# Patient Record
Sex: MALE | Race: BLACK OR AFRICAN AMERICAN | NOT HISPANIC OR LATINO | Employment: FULL TIME | URBAN - METROPOLITAN AREA
[De-identification: names, ages, dates, MRNs, and addresses within clinical notes are randomized per-mention and may not be internally consistent; named-entity substitution may affect disease eponyms.]

---

## 2021-10-22 ENCOUNTER — APPOINTMENT (OUTPATIENT)
Dept: RADIOLOGY | Facility: HOSPITAL | Age: 31
DRG: 638 | End: 2021-10-22
Attending: EMERGENCY MEDICINE
Payer: COMMERCIAL

## 2021-10-22 ENCOUNTER — HOSPITAL ENCOUNTER (INPATIENT)
Facility: HOSPITAL | Age: 31
LOS: 2 days | Discharge: HOME | DRG: 638 | End: 2021-10-24
Attending: EMERGENCY MEDICINE | Admitting: FAMILY MEDICINE
Payer: COMMERCIAL

## 2021-10-22 DIAGNOSIS — E11.65 TYPE 2 DIABETES MELLITUS WITH HYPERGLYCEMIA, WITHOUT LONG-TERM CURRENT USE OF INSULIN (H): ICD-10-CM

## 2021-10-22 DIAGNOSIS — E11.10 DIABETIC KETOACIDOSIS WITHOUT COMA ASSOCIATED WITH TYPE 2 DIABETES MELLITUS (H): ICD-10-CM

## 2021-10-22 DIAGNOSIS — E10.10 DIABETIC KETOACIDOSIS WITHOUT COMA ASSOCIATED WITH TYPE 1 DIABETES MELLITUS (H): Primary | ICD-10-CM

## 2021-10-22 PROBLEM — R73.03 PREDIABETES: Status: ACTIVE | Noted: 2018-02-14

## 2021-10-22 PROBLEM — N17.9 ACUTE KIDNEY INJURY (H): Status: ACTIVE | Noted: 2021-10-22

## 2021-10-22 PROBLEM — F41.9 ANXIETY: Status: ACTIVE | Noted: 2021-10-22

## 2021-10-22 PROBLEM — I10 HYPERTENSION: Status: ACTIVE | Noted: 2021-10-22

## 2021-10-22 LAB
ALBUMIN SERPL-MCNC: 5 G/DL (ref 3.5–5)
ALBUMIN SERPL-MCNC: 5.1 G/DL (ref 3.5–5)
ALBUMIN UR-MCNC: 30 MG/DL
ALP SERPL-CCNC: 79 U/L (ref 45–120)
ALP SERPL-CCNC: 83 U/L (ref 45–120)
ALT SERPL W P-5'-P-CCNC: 26 U/L (ref 0–45)
ALT SERPL W P-5'-P-CCNC: 27 U/L (ref 0–45)
ANION GAP SERPL CALCULATED.3IONS-SCNC: 17 MMOL/L (ref 5–18)
ANION GAP SERPL CALCULATED.3IONS-SCNC: 19 MMOL/L (ref 5–18)
ANION GAP SERPL CALCULATED.3IONS-SCNC: 21 MMOL/L (ref 5–18)
ANION GAP SERPL CALCULATED.3IONS-SCNC: 24 MMOL/L (ref 5–18)
APPEARANCE UR: CLEAR
AST SERPL W P-5'-P-CCNC: 17 U/L (ref 0–40)
AST SERPL W P-5'-P-CCNC: 18 U/L (ref 0–40)
BACTERIA #/AREA URNS HPF: ABNORMAL /HPF
BASE EXCESS BLDV CALC-SCNC: -10.6 MMOL/L
BASOPHILS # BLD AUTO: 0 10E3/UL (ref 0–0.2)
BASOPHILS NFR BLD AUTO: 0 %
BILIRUB DIRECT SERPL-MCNC: 0.2 MG/DL
BILIRUB SERPL-MCNC: 0.7 MG/DL (ref 0–1)
BILIRUB SERPL-MCNC: 0.7 MG/DL (ref 0–1)
BILIRUB UR QL STRIP: NEGATIVE
BUN SERPL-MCNC: 10 MG/DL (ref 8–22)
BUN SERPL-MCNC: 12 MG/DL (ref 8–22)
BUN SERPL-MCNC: 8 MG/DL (ref 8–22)
BUN SERPL-MCNC: 9 MG/DL (ref 8–22)
CALCIUM SERPL-MCNC: 8.4 MG/DL (ref 8.5–10.5)
CALCIUM SERPL-MCNC: 8.5 MG/DL (ref 8.5–10.5)
CALCIUM SERPL-MCNC: 8.7 MG/DL (ref 8.5–10.5)
CALCIUM SERPL-MCNC: 9.7 MG/DL (ref 8.5–10.5)
CHLORIDE BLD-SCNC: 100 MMOL/L (ref 98–107)
CHLORIDE BLD-SCNC: 101 MMOL/L (ref 98–107)
CHLORIDE BLD-SCNC: 101 MMOL/L (ref 98–107)
CHLORIDE BLD-SCNC: 94 MMOL/L (ref 98–107)
CO2 SERPL-SCNC: 14 MMOL/L (ref 22–31)
CO2 SERPL-SCNC: 15 MMOL/L (ref 22–31)
CO2 SERPL-SCNC: 16 MMOL/L (ref 22–31)
CO2 SERPL-SCNC: 16 MMOL/L (ref 22–31)
COLOR UR AUTO: COLORLESS
CREAT SERPL-MCNC: 1.74 MG/DL (ref 0.7–1.3)
CREAT SERPL-MCNC: 1.77 MG/DL (ref 0.7–1.3)
CREAT SERPL-MCNC: 1.87 MG/DL (ref 0.7–1.3)
CREAT SERPL-MCNC: 2.23 MG/DL (ref 0.7–1.3)
EOSINOPHIL # BLD AUTO: 0 10E3/UL (ref 0–0.7)
EOSINOPHIL NFR BLD AUTO: 0 %
ERYTHROCYTE [DISTWIDTH] IN BLOOD BY AUTOMATED COUNT: 12.4 % (ref 10–15)
GFR SERPL CREATININE-BSD FRML MDRD: 38 ML/MIN/1.73M2
GFR SERPL CREATININE-BSD FRML MDRD: 47 ML/MIN/1.73M2
GFR SERPL CREATININE-BSD FRML MDRD: 50 ML/MIN/1.73M2
GFR SERPL CREATININE-BSD FRML MDRD: 51 ML/MIN/1.73M2
GLUCOSE BLD-MCNC: 193 MG/DL (ref 70–125)
GLUCOSE BLD-MCNC: 236 MG/DL (ref 70–125)
GLUCOSE BLD-MCNC: 242 MG/DL (ref 70–125)
GLUCOSE BLD-MCNC: 335 MG/DL (ref 70–125)
GLUCOSE BLDC GLUCOMTR-MCNC: 179 MG/DL (ref 70–99)
GLUCOSE BLDC GLUCOMTR-MCNC: 187 MG/DL (ref 70–99)
GLUCOSE BLDC GLUCOMTR-MCNC: 198 MG/DL (ref 70–99)
GLUCOSE BLDC GLUCOMTR-MCNC: 212 MG/DL (ref 70–99)
GLUCOSE BLDC GLUCOMTR-MCNC: 216 MG/DL (ref 70–99)
GLUCOSE BLDC GLUCOMTR-MCNC: 221 MG/DL (ref 70–99)
GLUCOSE BLDC GLUCOMTR-MCNC: 235 MG/DL (ref 70–99)
GLUCOSE BLDC GLUCOMTR-MCNC: 235 MG/DL (ref 70–99)
GLUCOSE BLDC GLUCOMTR-MCNC: 257 MG/DL (ref 70–99)
GLUCOSE BLDC GLUCOMTR-MCNC: 265 MG/DL (ref 70–99)
GLUCOSE BLDC GLUCOMTR-MCNC: 335 MG/DL (ref 70–99)
GLUCOSE UR STRIP-MCNC: >1000 MG/DL
HBA1C MFR BLD: 13.9 %
HCO3 BLDV-SCNC: 16 MMOL/L (ref 24–30)
HCT VFR BLD AUTO: 47.7 % (ref 40–53)
HGB BLD-MCNC: 16 G/DL (ref 13.3–17.7)
HGB UR QL STRIP: NEGATIVE
HYALINE CASTS: 3 /LPF
IMM GRANULOCYTES # BLD: 0 10E3/UL
IMM GRANULOCYTES NFR BLD: 0 %
KETONES BLD-SCNC: 5.6 MMOL/L
KETONES BLD-SCNC: 5.91 MMOL/L
KETONES BLD-SCNC: 7.29 MMOL/L
KETONES BLD-SCNC: 8.36 MMOL/L
KETONES UR STRIP-MCNC: >150 MG/DL
LACTATE SERPL-SCNC: 1.5 MMOL/L (ref 0.7–2)
LEUKOCYTE ESTERASE UR QL STRIP: NEGATIVE
LYMPHOCYTES # BLD AUTO: 1.5 10E3/UL (ref 0.8–5.3)
LYMPHOCYTES NFR BLD AUTO: 21 %
MAGNESIUM SERPL-MCNC: 2 MG/DL (ref 1.8–2.6)
MCH RBC QN AUTO: 27.3 PG (ref 26.5–33)
MCHC RBC AUTO-ENTMCNC: 33.5 G/DL (ref 31.5–36.5)
MCV RBC AUTO: 81 FL (ref 78–100)
MONOCYTES # BLD AUTO: 0.6 10E3/UL (ref 0–1.3)
MONOCYTES NFR BLD AUTO: 8 %
MUCOUS THREADS #/AREA URNS LPF: PRESENT /LPF
NEUTROPHILS # BLD AUTO: 5 10E3/UL (ref 1.6–8.3)
NEUTROPHILS NFR BLD AUTO: 71 %
NITRATE UR QL: NEGATIVE
NRBC # BLD AUTO: 0 10E3/UL
NRBC BLD AUTO-RTO: 0 /100
OSMOLALITY SERPL: 302 MOSM/KG (ref 270–300)
OXYHGB MFR BLDV: 62.6 % (ref 70–75)
PCO2 BLDV: 35 MM HG (ref 35–50)
PH BLDV: 7.27 [PH] (ref 7.35–7.45)
PH UR STRIP: 5 [PH] (ref 5–7)
PHOSPHATE SERPL-MCNC: 2.4 MG/DL (ref 2.5–4.5)
PLATELET # BLD AUTO: 297 10E3/UL (ref 150–450)
PO2 BLDV: 35 MM HG (ref 25–47)
POTASSIUM BLD-SCNC: 2.6 MMOL/L (ref 3.5–5)
POTASSIUM BLD-SCNC: 3 MMOL/L (ref 3.5–5)
POTASSIUM BLD-SCNC: 3.1 MMOL/L (ref 3.5–5)
POTASSIUM BLD-SCNC: 3.3 MMOL/L (ref 3.5–5)
PROT SERPL-MCNC: 9.4 G/DL (ref 6–8)
PROT SERPL-MCNC: 9.5 G/DL (ref 6–8)
RBC # BLD AUTO: 5.87 10E6/UL (ref 4.4–5.9)
RBC URINE: <1 /HPF
SAO2 % BLDV: 64 % (ref 70–75)
SARS-COV-2 RNA RESP QL NAA+PROBE: NEGATIVE
SODIUM SERPL-SCNC: 134 MMOL/L (ref 136–145)
SODIUM SERPL-SCNC: 134 MMOL/L (ref 136–145)
SODIUM SERPL-SCNC: 135 MMOL/L (ref 136–145)
SODIUM SERPL-SCNC: 135 MMOL/L (ref 136–145)
SP GR UR STRIP: 1.03 (ref 1–1.03)
SQUAMOUS EPITHELIAL: <1 /HPF
TROPONIN I SERPL-MCNC: 0.02 NG/ML (ref 0–0.29)
UROBILINOGEN UR STRIP-MCNC: <2 MG/DL
WBC # BLD AUTO: 7.1 10E3/UL (ref 4–11)
WBC URINE: 1 /HPF

## 2021-10-22 PROCEDURE — 258N000003 HC RX IP 258 OP 636: Performed by: EMERGENCY MEDICINE

## 2021-10-22 PROCEDURE — 82805 BLOOD GASES W/O2 SATURATION: CPT | Performed by: EMERGENCY MEDICINE

## 2021-10-22 PROCEDURE — 250N000013 HC RX MED GY IP 250 OP 250 PS 637: Performed by: FAMILY MEDICINE

## 2021-10-22 PROCEDURE — 200N000001 HC R&B ICU

## 2021-10-22 PROCEDURE — 250N000009 HC RX 250: Performed by: EMERGENCY MEDICINE

## 2021-10-22 PROCEDURE — 87040 BLOOD CULTURE FOR BACTERIA: CPT | Performed by: FAMILY MEDICINE

## 2021-10-22 PROCEDURE — 83036 HEMOGLOBIN GLYCOSYLATED A1C: CPT | Performed by: EMERGENCY MEDICINE

## 2021-10-22 PROCEDURE — 71045 X-RAY EXAM CHEST 1 VIEW: CPT

## 2021-10-22 PROCEDURE — 81003 URINALYSIS AUTO W/O SCOPE: CPT | Performed by: EMERGENCY MEDICINE

## 2021-10-22 PROCEDURE — 36569 INSJ PICC 5 YR+ W/O IMAGING: CPT

## 2021-10-22 PROCEDURE — 80053 COMPREHEN METABOLIC PANEL: CPT | Performed by: EMERGENCY MEDICINE

## 2021-10-22 PROCEDURE — 250N000009 HC RX 250: Performed by: FAMILY MEDICINE

## 2021-10-22 PROCEDURE — 258N000002 HC RX IP 258 OP 250: Performed by: FAMILY MEDICINE

## 2021-10-22 PROCEDURE — 99285 EMERGENCY DEPT VISIT HI MDM: CPT | Mod: 25

## 2021-10-22 PROCEDURE — 84100 ASSAY OF PHOSPHORUS: CPT | Performed by: FAMILY MEDICINE

## 2021-10-22 PROCEDURE — 87635 SARS-COV-2 COVID-19 AMP PRB: CPT | Performed by: EMERGENCY MEDICINE

## 2021-10-22 PROCEDURE — 83930 ASSAY OF BLOOD OSMOLALITY: CPT | Performed by: FAMILY MEDICINE

## 2021-10-22 PROCEDURE — 84450 TRANSFERASE (AST) (SGOT): CPT | Performed by: FAMILY MEDICINE

## 2021-10-22 PROCEDURE — 82010 KETONE BODYS QUAN: CPT | Performed by: EMERGENCY MEDICINE

## 2021-10-22 PROCEDURE — 84484 ASSAY OF TROPONIN QUANT: CPT | Performed by: EMERGENCY MEDICINE

## 2021-10-22 PROCEDURE — 36415 COLL VENOUS BLD VENIPUNCTURE: CPT | Performed by: FAMILY MEDICINE

## 2021-10-22 PROCEDURE — 36415 COLL VENOUS BLD VENIPUNCTURE: CPT | Performed by: EMERGENCY MEDICINE

## 2021-10-22 PROCEDURE — 82010 KETONE BODYS QUAN: CPT | Performed by: FAMILY MEDICINE

## 2021-10-22 PROCEDURE — 80048 BASIC METABOLIC PNL TOTAL CA: CPT | Performed by: FAMILY MEDICINE

## 2021-10-22 PROCEDURE — 83735 ASSAY OF MAGNESIUM: CPT | Performed by: FAMILY MEDICINE

## 2021-10-22 PROCEDURE — 83605 ASSAY OF LACTIC ACID: CPT | Performed by: EMERGENCY MEDICINE

## 2021-10-22 PROCEDURE — 250N000011 HC RX IP 250 OP 636: Performed by: FAMILY MEDICINE

## 2021-10-22 PROCEDURE — 96365 THER/PROPH/DIAG IV INF INIT: CPT

## 2021-10-22 PROCEDURE — 272N000452 HC KIT SHRLOCK 5FR POWER PICC TRIPLE LUMEN

## 2021-10-22 PROCEDURE — 258N000001 HC RX 258: Performed by: FAMILY MEDICINE

## 2021-10-22 PROCEDURE — 96361 HYDRATE IV INFUSION ADD-ON: CPT

## 2021-10-22 PROCEDURE — 250N000012 HC RX MED GY IP 250 OP 636 PS 637: Performed by: EMERGENCY MEDICINE

## 2021-10-22 PROCEDURE — 99223 1ST HOSP IP/OBS HIGH 75: CPT | Performed by: FAMILY MEDICINE

## 2021-10-22 PROCEDURE — C9803 HOPD COVID-19 SPEC COLLECT: HCPCS

## 2021-10-22 PROCEDURE — 93005 ELECTROCARDIOGRAM TRACING: CPT | Performed by: EMERGENCY MEDICINE

## 2021-10-22 PROCEDURE — 85025 COMPLETE CBC W/AUTO DIFF WBC: CPT | Performed by: EMERGENCY MEDICINE

## 2021-10-22 RX ORDER — HEPARIN SODIUM 5000 [USP'U]/.5ML
5000 INJECTION, SOLUTION INTRAVENOUS; SUBCUTANEOUS EVERY 12 HOURS
Status: DISCONTINUED | OUTPATIENT
Start: 2021-10-22 | End: 2021-10-24 | Stop reason: HOSPADM

## 2021-10-22 RX ORDER — LIDOCAINE 40 MG/G
CREAM TOPICAL
Status: DISCONTINUED | OUTPATIENT
Start: 2021-10-22 | End: 2021-10-24 | Stop reason: HOSPADM

## 2021-10-22 RX ORDER — ONDANSETRON 4 MG/1
4 TABLET, ORALLY DISINTEGRATING ORAL EVERY 6 HOURS PRN
Status: DISCONTINUED | OUTPATIENT
Start: 2021-10-22 | End: 2021-10-24 | Stop reason: HOSPADM

## 2021-10-22 RX ORDER — DEXTROSE MONOHYDRATE 25 G/50ML
25-50 INJECTION, SOLUTION INTRAVENOUS
Status: DISCONTINUED | OUTPATIENT
Start: 2021-10-22 | End: 2021-10-24 | Stop reason: HOSPADM

## 2021-10-22 RX ORDER — OXYMETAZOLINE HYDROCHLORIDE 0.05 G/100ML
2 SPRAY NASAL ONCE
Status: COMPLETED | OUTPATIENT
Start: 2021-10-22 | End: 2021-10-22

## 2021-10-22 RX ORDER — SODIUM CHLORIDE 450 MG/100ML
1000 INJECTION, SOLUTION INTRAVENOUS CONTINUOUS
Status: DISCONTINUED | OUTPATIENT
Start: 2021-10-22 | End: 2021-10-23

## 2021-10-22 RX ORDER — PANTOPRAZOLE SODIUM 20 MG/1
40 TABLET, DELAYED RELEASE ORAL
Status: DISCONTINUED | OUTPATIENT
Start: 2021-10-22 | End: 2021-10-24 | Stop reason: HOSPADM

## 2021-10-22 RX ORDER — SODIUM CHLORIDE 9 MG/ML
INJECTION, SOLUTION INTRAVENOUS CONTINUOUS
Status: DISCONTINUED | OUTPATIENT
Start: 2021-10-22 | End: 2021-10-23

## 2021-10-22 RX ORDER — ACETAMINOPHEN 325 MG/1
975 TABLET ORAL EVERY 8 HOURS
Status: DISCONTINUED | OUTPATIENT
Start: 2021-10-22 | End: 2021-10-24 | Stop reason: HOSPADM

## 2021-10-22 RX ORDER — DEXTROSE MONOHYDRATE, SODIUM CHLORIDE, AND POTASSIUM CHLORIDE 50; 2.24; 4.5 G/1000ML; G/1000ML; G/1000ML
INJECTION, SOLUTION INTRAVENOUS CONTINUOUS
Status: CANCELLED | OUTPATIENT
Start: 2021-10-22

## 2021-10-22 RX ORDER — ZOLPIDEM TARTRATE 5 MG/1
5 TABLET ORAL
Status: DISCONTINUED | OUTPATIENT
Start: 2021-10-22 | End: 2021-10-24 | Stop reason: HOSPADM

## 2021-10-22 RX ORDER — POTASSIUM CHLORIDE 29.8 MG/ML
20 INJECTION INTRAVENOUS
Status: COMPLETED | OUTPATIENT
Start: 2021-10-22 | End: 2021-10-22

## 2021-10-22 RX ORDER — ZOLPIDEM TARTRATE 6.25 MG/1
12.5 TABLET, FILM COATED, EXTENDED RELEASE ORAL
Status: DISCONTINUED | OUTPATIENT
Start: 2021-10-22 | End: 2021-10-22

## 2021-10-22 RX ORDER — HYDRALAZINE HYDROCHLORIDE 20 MG/ML
10 INJECTION INTRAMUSCULAR; INTRAVENOUS EVERY 6 HOURS PRN
Status: DISCONTINUED | OUTPATIENT
Start: 2021-10-22 | End: 2021-10-24 | Stop reason: HOSPADM

## 2021-10-22 RX ORDER — POTASSIUM CHLORIDE 29.8 MG/ML
20 INJECTION INTRAVENOUS
Status: COMPLETED | OUTPATIENT
Start: 2021-10-23 | End: 2021-10-23

## 2021-10-22 RX ORDER — CHLORTHALIDONE 50 MG/1
100 TABLET ORAL EVERY MORNING
Status: ON HOLD | COMMUNITY
Start: 2021-09-27 | End: 2021-10-24

## 2021-10-22 RX ORDER — ONDANSETRON 2 MG/ML
4 INJECTION INTRAMUSCULAR; INTRAVENOUS EVERY 6 HOURS PRN
Status: DISCONTINUED | OUTPATIENT
Start: 2021-10-22 | End: 2021-10-24 | Stop reason: HOSPADM

## 2021-10-22 RX ORDER — NICOTINE POLACRILEX 4 MG
15-30 LOZENGE BUCCAL
Status: DISCONTINUED | OUTPATIENT
Start: 2021-10-22 | End: 2021-10-24 | Stop reason: HOSPADM

## 2021-10-22 RX ORDER — ZOLPIDEM TARTRATE 12.5 MG/1
12.5 TABLET, FILM COATED, EXTENDED RELEASE ORAL
COMMUNITY
Start: 2021-10-17

## 2021-10-22 RX ADMIN — DEXTROSE MONOHYDRATE AND SODIUM CHLORIDE 1000 ML: 5; .45 INJECTION, SOLUTION INTRAVENOUS at 19:00

## 2021-10-22 RX ADMIN — SODIUM CHLORIDE 1000 ML: 9 INJECTION, SOLUTION INTRAVENOUS at 11:54

## 2021-10-22 RX ADMIN — HYDRALAZINE HYDROCHLORIDE 10 MG: 20 INJECTION INTRAMUSCULAR; INTRAVENOUS at 15:02

## 2021-10-22 RX ADMIN — POTASSIUM CHLORIDE 20 MEQ: 29.8 INJECTION, SOLUTION INTRAVENOUS at 20:10

## 2021-10-22 RX ADMIN — HEPARIN SODIUM 5000 UNITS: 10000 INJECTION, SOLUTION INTRAVENOUS; SUBCUTANEOUS at 21:04

## 2021-10-22 RX ADMIN — SODIUM CHLORIDE 1000 ML: 4.5 INJECTION, SOLUTION INTRAVENOUS at 14:23

## 2021-10-22 RX ADMIN — ACETAMINOPHEN 975 MG: 325 TABLET ORAL at 19:46

## 2021-10-22 RX ADMIN — POTASSIUM CHLORIDE 20 MEQ: 29.8 INJECTION, SOLUTION INTRAVENOUS at 23:38

## 2021-10-22 RX ADMIN — POTASSIUM CHLORIDE 20 MEQ: 29.8 INJECTION, SOLUTION INTRAVENOUS at 22:06

## 2021-10-22 RX ADMIN — POTASSIUM CHLORIDE 20 MEQ: 29.8 INJECTION, SOLUTION INTRAVENOUS at 21:02

## 2021-10-22 RX ADMIN — OXYMETHAZOLINE HCL 2 SPRAY: 0.05 SPRAY NASAL at 13:03

## 2021-10-22 RX ADMIN — SODIUM CHLORIDE 3 UNITS/HR: 9 INJECTION, SOLUTION INTRAVENOUS at 13:37

## 2021-10-22 RX ADMIN — LIDOCAINE HYDROCHLORIDE 1 ML: 10 INJECTION, SOLUTION EPIDURAL; INFILTRATION; INTRACAUDAL; PERINEURAL at 18:57

## 2021-10-22 RX ADMIN — SODIUM CHLORIDE 1000 ML: 9 INJECTION, SOLUTION INTRAVENOUS at 13:18

## 2021-10-22 ASSESSMENT — ACTIVITIES OF DAILY LIVING (ADL)
ADLS_ACUITY_SCORE: 8
ADLS_ACUITY_SCORE: 8
ADLS_ACUITY_SCORE: 7
ADLS_ACUITY_SCORE: 7
ADLS_ACUITY_SCORE: 8
ADLS_ACUITY_SCORE: 7
ADLS_ACUITY_SCORE: 8
ADLS_ACUITY_SCORE: 7

## 2021-10-22 ASSESSMENT — MIFFLIN-ST. JEOR: SCORE: 2668.58

## 2021-10-22 NOTE — PROCEDURES
"PICC Line Insertion Procedure Note  Pt. Name: Hermann Escalante Jr.  MRN:        [id number]    Procedure: Insertion of a  triple Lumen  5 fr  Bard SOLO (valved) Power PICC, Lot number [id number]    Indications: Vascular Access    Contraindications : none    Procedure Details   Patient identified with 2 identifiers and \"Time Out\" conducted.  .     Central line insertion bundle followed: hand hygeine performed prior to procedure, site cleansed with cholraprep, hat, mask, sterile gloves,sterile gown worn, patient draped with maximum barrier head to toe drape, sterile field maintained.    The vein was assessed and found to be compressible and of adequate size. 1 ml 1% Lidocaine administered sq to the insertion site. A 5 Fr PICC was inserted into the basilic vein of the right arm with ultrasound guidance. 1 attempt(s) required to access vein.   Catheter threaded without difficulty. Good blood return noted.    Modified Seldinger Technique used for insertion.    The 8 sharps that are included in the PICC insertion kit were accounted for and disposed of in the sharps container prior to breakdown of the sterile field.    Catheter secured with Statlock, biopatch and Tegaderm dressing applied.    Findings:  Total catheter length  48 cm, with 0 cm exposed. Mid upper arm circumference is 47 cm. Catheter was flushed with 30 cc NS. Patient  tolerated procedure well.    Tip placement verified by 3CG technology . Tip placement in the SVC.    CLABSI prevention brochure left at bedside.    Patient's primary RN notified PICC is ready for use.    Comments:          Andreina LAMBN,RN,[facility] V[facility]        "

## 2021-10-22 NOTE — PHARMACY-ADMISSION MEDICATION HISTORY
Pharmacy Note - Admission Medication History       ______________________________________________________________________    Prior To Admission (PTA) med list completed and updated in EMR.       PTA Med List   Medication Sig Last Dose     chlorthalidone (HYGROTON) 50 MG tablet Take 100 mg by mouth every morning Past Week     metFORMIN (GLUCOPHAGE) 500 MG tablet Take 500 mg by mouth daily (with breakfast) Past Week     omeprazole (PRILOSEC) 20 MG DR capsule Take 20 mg by mouth 2 times daily (before meals) Has not started     zolpidem ER (AMBIEN CR) 12.5 MG CR tablet Take 12.5 mg by mouth nightly as needed Past Month       Information source(s): Patient, Clinic records and [facility]  Method of interview communication: in-person    Summary of Changes to PTA Med List  New: chlorthalidone, zolpidem, metformin, omeprazole   Discontinued: Flonase  Changed: none    Patient was asked about OTC/herbal products specifically.  PTA med list reflects this.    In the past week, patient estimated taking medication this percent of the time:  50-90% due to other.    Allergies were reviewed, assessed, and updated with the patient.      Patient does not use any multi-dose medications prior to admission.    The information provided in this note is only as accurate as the sources available at the time of the update(s).    Thank you for the opportunity to participate in the care of this patient.    Radames España [facility]  10/22/2021 1:52 PM

## 2021-10-22 NOTE — ED PROVIDER NOTES
"EMERGENCY DEPARTMENT NOTE     Name: Hermann Escalante    Age/Sex: 30 year old male   MRN: [id number]   Evaluation Date & Time:  No admission date for patient encounter.    PCP:    No Ref-Primary, Physician   ED Provider: Dorian Fields D.O.       CHIEF COMPLAINT    Abnormal Labs (kidney function/hyperglycemia)       DIAGNOSIS & DISPOSITION     1. Diabetic ketoacidosis without coma associated with type 2 diabetes mellitus (H)      DISPOSITION: Home    At the conclusion of the encounter I discussed the results of all of the tests and the disposition. The questions were answered. The patient or family acknowledged understanding and was agreeable with the care plan.    TOTAL CRITICAL CARE TIME (EXCLUDING PROCEDURES): 30 minutes    PROCEDURES:   None    EMERGENCY DEPARTMENT COURSE/MEDICAL DECISION MAKING   12:23 PM I met with the patient to gather history and to perform my initial exam.  We discussed treatment options and the plan for care while in the Emergency Department. PPE: Provider wore gloves, N95 mask, eye protection, surgical cap, and paper mask.  1:07 PM I spoke with Dr. Vasquez, Intensivist.    1:25 PM I discussed the case with hospitalist, Dr Gomez, who accepts the patient     Hermann Escalante is a 30 year old male with relevant past history of non-insulin-dependent diabetes and hypertension who presents to the emergency department for evaluation for out patient clinic evaluation for nausea yesterday indicated diabetic ketoacidosis  Triage note reviewed:Pt was called last night by primary clinic, [place], with test results and instructed pt to be seen in ED for evaluation. Phone diagnosed Type II Diabetic. BS currently 335.     Vital signs:BP (!) 149/69   Pulse 120   Temp 98  F (36.7  C)   Resp 25   Ht 1.905 m (6' 3\")   Wt (!) 163.3 kg (360 lb)   SpO2 100%   BMI 45.00 kg/m    Pertinent physical exam findings:  HEENT: Neck supple without meningeal irritation  Oropharynx normal  Cardiac: Tachycardic no " murmur rub  Pulmonary: Lungs are clear to ascultation bilaterally with good breath sounds  Abdomen: Soft nontender, positive bowel sounds.  No organomegaly or mass  Skin: No rash or open ulceration identified  Diagnostic studies:  Imaging: Portable chest x-ray: No infiltrate  Lab: Glucose 335, CO2 16, ketones 8.3, lactic acid 1.5, pH 7.27 creatinine 2.2 with GFR 38, potassium 3.3  EKG: Sinus tachycardia.  No STEMI.  Troponin 0 0.02  Interventions: IV fluids, subcutaneous and IV insulin  Medical decision making: Patient remains with mild tachycardia but otherwise hemodynamically stable.  No inciting cause of DKA is identified currently with no evidence of pneumonia, urinary tract infection, evidence of ACS.  Abdominal exam nontender and does not appear to have acute surgical process.  Limited skin exam in the hallway of the ED but no reported diabetic foot ulcers or symptoms of cellulitis.  Patient will be admitted to the medical ICU with IV insulin, fluids for treatment of DKA.    ED INTERVENTIONS     Medications   0.9% sodium chloride BOLUS (0 mLs Intravenous Stopped 10/22/21 1320)     Followed by   sodium chloride 0.9% infusion ( Intravenous Not Given 10/22/21 1321)   glucose gel 15-30 g (has no administration in time range)     Or   dextrose 50 % injection 25-50 mL (has no administration in time range)     Or   glucagon injection 1 mg (has no administration in time range)   dextrose 5% and 0.45% NaCl infusion ( Intravenous Canceled Entry 10/22/21 1421)   dextrose 50 % injection 25-50 mL (has no administration in time range)   0.9% sodium chloride BOLUS (0 mLs Intravenous Stopped 10/22/21 1421)     Followed by   0.45% sodium chloride infusion ( Intravenous Rate/Dose Verify 10/22/21 1600)   insulin 1 unit/1 mL in NS (NovoLIN, HumuLIN Regular) drip -ED DKA algorithm (10 Units/hr Intravenous Rate/Dose Change 10/22/21 1640)   heparin ANTICOAGULANT injection 5,000 Units (has no administration in time range)   lidocaine  1 % 0.1-1 mL (has no administration in time range)   lidocaine (LMX4) cream (has no administration in time range)   sodium chloride (PF) 0.9% PF flush 3 mL (3 mLs Intracatheter Not Given 10/22/21 1504)   sodium chloride (PF) 0.9% PF flush 3 mL (has no administration in time range)   Patient is already receiving anticoagulation with heparin, enoxaparin (LOVENOX), warfarin (COUMADIN)  or other anticoagulant medication (has no administration in time range)   ondansetron (ZOFRAN-ODT) ODT tab 4 mg (has no administration in time range)     Or   ondansetron (ZOFRAN) injection 4 mg (has no administration in time range)   acetaminophen (TYLENOL) tablet 975 mg (975 mg Oral Not Given 10/22/21 1505)   hydrALAZINE (APRESOLINE) injection 10 mg (10 mg Intravenous Given 10/22/21 1502)   pantoprazole (PROTONIX) EC tablet 40 mg (has no administration in time range)   zolpidem (AMBIEN) tablet 5 mg (has no administration in time range)   lidocaine 1 % 0.1-5 mL (has no administration in time range)   lidocaine (LMX4) cream (has no administration in time range)   sodium chloride (PF) 0.9% PF flush 10-40 mL (has no administration in time range)   oxymetazoline (AFRIN) 0.05 % spray 2 spray (2 sprays Nasal Given 10/22/21 1303)       DISCHARGE MEDICATIONS        Review of your medicines      UNREVIEWED medicines. Ask your doctor about these medicines      Dose / Directions   chlorthalidone 50 MG tablet  Commonly known as: HYGROTON      Dose: 100 mg  Take 100 mg by mouth every morning  Refills: 0     metFORMIN 500 MG tablet  Commonly known as: GLUCOPHAGE      Dose: 500 mg  Take 500 mg by mouth daily (with breakfast)  Refills: 0     omeprazole 20 MG DR capsule  Commonly known as: priLOSEC      Dose: 20 mg  Take 20 mg by mouth 2 times daily (before meals)  Refills: 0     zolpidem ER 12.5 MG CR tablet  Commonly known as: AMBIEN CR      Dose: 12.5 mg  Take 12.5 mg by mouth nightly as needed  Refills: 0              INFORMATION SOURCE AND  LIMITATIONS    History/Exam limitations: none  Patient information was obtained from: the patient   Use of : N/A    HISTORY OF PRESENT ILLNESS   Hermann Escalante male with a relevant past history of anxiety, hypertension, and NIDDM, who presents to this ED via walk in for evaluation of abnormal labs.    The patient reports that he was referred to the ED after being diagnosed with diabetes this morning. The patient endorses nausea and urinary frequency. He states that he has attempted to vomit but has not been able to. He denies cough, chest pain, shortness of breath, diarrhea, sores on feet, and any other symptoms or complaints at this time.         REVIEW OF SYSTEMS:   Constitutional: Negative for  fever.   HENT: Negative for URI symptoms or sore throat.    Cardiac: Negative for chest pain, palpitations, near syncope or syncope  Respiratory: Negative for cough and shortness of breath.    Gastrointestinal: Negative for abdominal pain, vomiting, constipation, diarrhea, rectal bleeding or melena. Positive for nausea.   Genitourinary: Negative for dysuria, flank pain and hematuria. Positive for frequency.  Musculoskeletal: Negative for back pain.   Skin: Negative for  rash  Neurological: Negative for dizziness, headache, syncope, speech difficulty, unilateral weakness or imbalance with walking.   Hematological: Negative for adenopathy. Does not bruise/bleed easily.   Psychiatric/Behavioral: Negative for confusion.       PATIENT HISTORY     Past Medical History:   Diagnosis Date     Diabetes (H)      Hypertension      Morbid obesity (H)      Sleep apnea      Patient Active Problem List   Diagnosis     Anxiety     Hypertension     Prediabetes     Type 2 diabetes mellitus with hyperglycemia, without long-term current use of insulin (H)     Acute kidney injury (H)     DKA (diabetic ketoacidosis) (H)     Past Surgical History:   Procedure Laterality Date     NO HISTORY OF SURGERY       Social Histrory  Smoking:  Never smoked  Alcohol Use: No alcohol use  Allergies   Allergen Reactions     Tramadol Itching     Amlodipine Palpitations         OUTPATIENT MEDICATIONS     Current Discharge Medication List         Vitals:    10/22/21 1615 10/22/21 1630 10/22/21 1645 10/22/21 1700   BP: (!) 158/73 (!) 149/69     Pulse: 119 119 (!) 123 120   Resp: (!) 31 25 27 25   Temp:       TempSrc:       SpO2: 100% 99% 100%    Weight:       Height:           Physical Exam   Constitutional: Oriented to person, place, and time. Appears well-developed and well-nourished.   HEENT:    Head: Atraumatic.   Neck: Normal range of motion. Neck supple.   Cardiovascular: Normal rate, regular rhythm and normal heart sounds.    Pulmonary/Chest: Normal effort  and breath sounds normal.   Abdominal: Soft. Bowel sounds are normal.   Musculoskeletal: Normal range of motion.   Neurological: Alert and oriented to person, place, and time. Normal strength.No sensory deficit. No cranial nerve deficit . Skin: Skin is warm and dry.   Psychiatric: Normal mood and affect. Behavior is normal. Thought content normal.       DIAGNOSTICS    LABORATORY FINDINGS (REVIEWED AND INTERPRETED):  Labs Ordered and Resulted from Time of ED Arrival Up to the Time of Departure from the ED   KETONE BETA-HYDROXYBUTYRATE QUANTITATIVE, RAPID - Abnormal; Notable for the following components:       Result Value    Ketone (Beta-Hydroxybutyrate) Quantitative 8.36 (*)     All other components within normal limits   COMPREHENSIVE METABOLIC PANEL - Abnormal; Notable for the following components:    Sodium 134 (*)     Potassium 3.3 (*)     Chloride 94 (*)     Carbon Dioxide (CO2) 16 (*)     Anion Gap 24 (*)     Creatinine 2.23 (*)     Glucose 335 (*)     Protein Total 9.4 (*)     GFR Estimate 38 (*)     All other components within normal limits   ROUTINE UA WITH MICROSCOPIC REFLEX TO CULTURE - Abnormal; Notable for the following components:    Glucose Urine >1000 (*)     Ketones Urine >150 (*)      Specific Gravity Urine 1.033 (*)     Protein Albumin Urine 30  (*)     Bacteria Urine Few (*)     Mucus Urine Present (*)     Hyaline Casts Urine 3 (*)     All other components within normal limits    Narrative:     Urine Culture not indicated   GLUCOSE BY METER - Abnormal; Notable for the following components:    GLUCOSE BY METER POCT 335 (*)     All other components within normal limits   BLOOD GAS VENOUS - Abnormal; Notable for the following components:    pH Venous 7.27 (*)     Bicarbonate Venous 16 (*)     Oxyhemoglobin Venous 62.6 (*)     O2 Sat, Venous 64.0 (*)     All other components within normal limits   HEMOGLOBIN A1C - Abnormal; Notable for the following components:    Hemoglobin A1C 13.9 (*)     All other components within normal limits   GLUCOSE BY METER - Abnormal; Notable for the following components:    GLUCOSE BY METER POCT 265 (*)     All other components within normal limits   LACTIC ACID WHOLE BLOOD - Normal   TROPONIN I - Normal   COVID-19 VIRUS (CORONAVIRUS) BY PCR - Normal    Narrative:     Testing was performed using the yessenia  SARS-CoV-2 & Influenza A/B Assay on the yessenia  Rashida  System.  This test should be ordered for the detection of SARS-COV-2 in individuals who meet SARS-CoV-2 clinical and/or epidemiological criteria. Test performance is unknown in asymptomatic patients.  This test is for in vitro diagnostic use under the FDA EUA for laboratories certified under CLIA to perform moderate and/or high complexity testing. This test has not been FDA cleared or approved.  A negative test does not rule out the presence of PCR inhibitors in the specimen or target RNA in concentration below the limit of detection for the assay. The possibility of a false negative should be considered if the patient's recent exposure or clinical presentation suggests COVID-19.  [facility] are certified under the Clinical Laboratory Improvement Amendments of 1988 (CLIA-88) as qualified to  perform moderate and/or high complexity laboratory testing.   CBC WITH PLATELETS AND DIFFERENTIAL   GLUCOSE MONITOR NURSING POCT   GLUCOSE MONITOR NURSING POCT   GLUCOSE MONITOR NURSING POCT   GLUCOSE MONITOR NURSING POCT   GLUCOSE MONITOR NURSING POCT   KETONE BETA-HYDROXYBUTYRATE QUANTITATIVE, RAPID   BASIC METABOLIC PANEL   BASIC METABOLIC PANEL   IP CARBOHYDRATE COUNTING BY NURSING   PATIENT EDUCATION   ASSESS FOR HYPOGLYCEMIA SYMPTOMS   NOTIFY PHYSICIAN   CALL   CALL   CARDIAC CONTINUOUS MONITORING   NOTIFY PHYSICIAN   PERIPHERAL IV CATHETER   IV ACCESS   ASSIGN ATTENDING PROVIDER   ASSIGN ATTENDING PROVIDER   INTAKE AND OUTPUT   CARDIAC CONTINUOUS MONITORING   TELEMETRY MONITORING CARDIOLOGY ADULT   NOTIFY PHYSICIAN   PATIENT EDUCATION   ASSESS FOR HYPOGLYCEMIA SYMPTOMS   NOTIFY PHYSICIAN   VITAL SIGNS   PERIPHERAL IV CATHETER   NO VTE PROPHYLAXIS   BLOOD CULTURE   CBC WITH PLATELETS & DIFFERENTIAL    Narrative:     The following orders were created for panel order CBC with platelets differential.  Procedure                               Abnormality         Status                     ---------                               -----------         ------                     CBC with platelets and d...[[id number]]                      Final result                 Please view results for these tests on the individual orders.         IMAGING (REVIEWED AND INTERPRETED):  XR Chest Port 1 View   Final Result   IMPRESSION: Large body habitus. Lungs are clear. Heart and pulmonary vascularity are normal. No signs of acute disease.          ECG (REVIEWED AND INTERPRETED):   ECG:   Performed at: 11:35  HR:  105 bpm  Rhythm: Sinus tachycardia  Axis: -36  QRS duration: 102  QTC: 489  ST changes: No ST segment elevation or depression, no T wave inversion,No Q wave  Interpretation: Sinus tachycardia,non ischemic  No prior for comparison    I have reviewed the patient's ECG, with comments made as listed above. Please see scanned  image for full interpretation.       I, Carmina Oropeza, am serving as a scribe to document services personally performed by Dorian Fields D.O., based on my observation and the provider s statements to me.    I, Dorian Fields D.O., attest that Carmina Sandip is acting in a scribe capacity, has observed my performance of the services and has documented them in accordance with my direction.    Dorian Fields D.O.  EMERGENCY MEDICINE   10/22/21  [facility] EMERGENCY DEPARTMENT  [address] [place] [id number]  [phone]  Dept: [phone]      Dorian Fields, DO  10/22/21 1826

## 2021-10-22 NOTE — ED NOTES
Report called to ICU RN.  All labs, meds and treatments reviewed.  Glucose to be done on arrival to ICU reported to ICU RN

## 2021-10-22 NOTE — PROGRESS NOTES
"Care Management Note    Length of Stay (days): 0    Expected Discharge Date: 10/24/2021       Concerns to be Addressed:   Alteration in labs (DKA, CHET), IV fluids at 250 ml/hour, insulin drip.  Patient plan of care discussed at interdisciplinary rounds: Yes    Anticipated Discharge Disposition:  Discharge goal is home pending response to treatment, medical needs and mobility closer to discharge.      Anticipated Discharge Services:  No skilled in-home services anticipated.   Anticipated Discharge DME:  No new DME anticipated.    Patient/family educated on Medicare website which has current facility and service quality ratings:  NA  Education Provided on the Discharge Plan:  Per team  Patient/Family in Agreement with the Plan:  yes    Referrals Placed by CM/SW:  None  Private pay costs discussed: Not applicable     Additional Information:  Patient is  and independent with activities of daily living at baseline.  He has a history of PARKER and uses CPAP at home. No care management needs identified at this time but CM will continue to monitor progression of care, review team recommendations and provide discharge planning assist as needed.    Addendum: Per pharmacy liaison, \"Test claim for Diabetes Ed: Lantus $25 Humalog $25 and Preferred meter and supplies One Touch covered 100% no cost for the patient.\" This has been documented in a sticky note for MD.     Carmina Lemon RN      "

## 2021-10-22 NOTE — ED NOTES
Pt up to bathroom.  Nosebleed bilat nares.  Pressure held MD notified.  Resolved after pressure and afrin.

## 2021-10-22 NOTE — ED TRIAGE NOTES
Pt was called last night by primary clinic, [place], with test results and instructed pt to be seen in ED for evaluation. Phone diagnosed Type II Diabetic. BS currently 335.

## 2021-10-22 NOTE — H&P
Admission History and Physical   Hermann Escalante Jr., 1990, [id number]  [facility]  DKA (diabetic ketoacidosis) (H) [E11.10]  PCP:Lili Mix, [phone]   Admitting provider: Nicole Dickerson MD.    Code status:  Full Code          Extended Emergency Contact Information  Primary Emergency Contact: Gina Maher  Address: [facility] [facility] [address] [place] [address] [facility]  Home Phone: [phone]  Relation: Spouse  Secondary Emergency Contact: ELY GOLDSTEIN  Home Phone: [phone]  Relation: Mother  Father: Hermann Escalante  Address: [address] [place] [address] [facility]  Home Phone: [phone]     Date of service: October 22, 2021    Assessment and Plan  Active Problems:    Hypertension    Type 2 diabetes mellitus with hyperglycemia, without long-term current use of insulin (H)    Acute kidney injury (H)    DKA (diabetic ketoacidosis) (H)    Hermann Escalante Jr. is a 30 year old male with HTN, prediabetes, HLD, PARKER and morbid obesity presenting with recent findings of abnormal labs at primary care clinic.  Glucose >300s with Cr 2.27.  PH 7.27 with Bicarb 16 and AG 24.    Diabetes Mellitus with hyperglycemia and mild DKA  -Prediabetes on metformin only now with A1c > 13  - Insulin infusion, replace lytes, IVF, follow DKA orderset  -RD and diabetic RN education  - DM RN recommending meal insulin with long acting at discharge to start---see notes    CHET  -baseline last know Cr normal and currently 2.23  - trend with IVF  - stop metformin  - avoid nephrotoxins and renally dose meds    HTN  - hold home diuretic and ACE/ARB for now with CHET  - hydralazine prn    Nausea  - unclear if related to above however also recent start PPI which will continue  - trend response  - currently denying nausea and has not had vomiting  - will allow clear liquid diet to start    PARKER  - home CPAP      COVID19 screening: 10/22/21 negative  VTE  prophylaxis:  Heparin SQ  DIET: None    Drains/Lines: PIV  Weight bearing status: as maryjane  Anticipate 1-2 midnight length of stay for treatment of mild DKA with CHET  Disposition/Barriers to discharge: IVF, Insulin infusion  Code Status:Full Code discussed with patient    HPI: Hermann Escalante Jr. is a 30 year old old male with h/o prediabetes, HTN, obesity and PARKER who presented with abnormal labs.  Saw primary MD yesterday with nausea and mild epigastric discomfort that had been going on a few weeks.  Started PPI and labs drawn.  Unfortunately glucose high and Cr up so sent to ER.  In ER noted mild DKA with mild acidosis, low bicarb and AG.  Glucose only mild up.  Did have ketones.  Has been started on insulin infusion and admitted to ICU.  IVF in process as well.    Patient at this time no longer having nausea or epigastric pain.  Has had thirst and polyuria but no burning or discomfort.  No diarrhea or constipation or melena.  No vomiting.  No CP or SOB.  No fever, sweats or chills.     Past Medical History:   Diagnosis Date     Diabetes (H)      Hypertension      Morbid obesity (H)      Sleep apnea      Past Surgical History:   Procedure Laterality Date     NO HISTORY OF SURGERY       Family History   Problem Relation Age of Onset     Sleep Apnea Father      Diabetes Sister      Diabetes Sister      Diabetes Sister      Hypertension Maternal Grandmother      No Known Problems Paternal Grandfather      Social History     Socioeconomic History     Marital status:      Spouse name: Not on file     Number of children: Not on file     Years of education: Not on file     Highest education level: Not on file   Occupational History     Not on file   Tobacco Use     Smoking status: Never Smoker     Smokeless tobacco: Never Used   Substance and Sexual Activity     Alcohol use: No     Drug use: No     Sexual activity: Not on file   Other Topics Concern     Parent/sibling w/ CABG, MI or angioplasty before 65F 55M? Not  Asked   Social History Narrative     Not on file     Social Determinants of Health     Financial Resource Strain:      Difficulty of Paying Living Expenses:    Food Insecurity:      Worried About Running Out of Food in the Last Year:      Ran Out of Food in the Last Year:    Transportation Needs:      Lack of Transportation (Medical):      Lack of Transportation (Non-Medical):    Physical Activity:      Days of Exercise per Week:      Minutes of Exercise per Session:    Stress:      Feeling of Stress :    Social Connections:      Frequency of Communication with Friends and Family:      Frequency of Social Gatherings with Friends and Family:      Attends [employer] Services:      Active Member of Clubs or Organizations:      Attends Club or Organization Meetings:      Marital Status:    Intimate Partner Violence:      Fear of Current or Ex-Partner:      Emotionally Abused:      Physically Abused:      Sexually Abused:      Allergies   Allergen Reactions     Tramadol Itching     Amlodipine Palpitations       PRIOR TO ADMISSION MEDICATIONS   (Not in a hospital admission)    Hygroton, metformin, omeprazole, zolpidem    REVIEW OF SYSTEMS:  12 point reviewed pertinent negatives and positives in HPI all others negative     PHYSICAL EXAM  B/P:145/93 T:97.8 P:112 R: 16     Constitutional: awake, alert, cooperative, no apparent distress, and appears stated age  Eyes: Lids and lashes normal, pupils equal, round and reactive to light, extra ocular muscles intact, sclera clear, conjunctiva normal  ENT: normocepalic, without obvious abnormality, atramatic  Respiratory: No increased work of breathing, good air exchange, clear to auscultation bilaterally, no crackles or wheezing  Cardiovascular: Normal apical impulse, regular rate and rhythm, normal S1 and S2, no S3 or S4, and no murmur noted  GI: No scars, normal bowel sounds, soft, non-distended, non-tender, no masses palpated, no hepatosplenomegally  Skin: no bruising or  bleeding  Musculoskeletal: There is no redness, warmth, or swelling of the joints.  Full range of motion noted.  Motor strength is 5 out of 5 all extremities bilaterally.  Tone is normal.  Neurologic: A&Ox3.  Grossly intact.  Moves all extremities equally.  CNII-XII intact.  Neuropsychiatric: General: normal, calm and normal eye contact  Level of consciousness: alert / normal    PERTINENT LABS and RADIOLOGY   Recent Labs   Lab 10/22/21  1337 10/22/21  1152 10/22/21  1004   WBC  --  7.1  --    HGB  --  16.0  --    MCV  --  81  --    PLT  --  297  --    NA  --  134*  --    POTASSIUM  --  3.3*  --    CHLORIDE  --  94*  --    CO2  --  16*  --    BUN  --  12  --    CR  --  2.23*  --    ANIONGAP  --  24*  --    SARAH  --  9.7  --    * 335* 335*   ALBUMIN  --  5.0  --    PROTTOTAL  --  9.4*  --    BILITOTAL  --  0.7  --    ALKPHOS  --  79  --    ALT  --  26  --    AST  --  17  --      PH 7.27, Bicarb 16    No results found for this or any previous visit (from the past 24 hour(s)).  EKG:  10/22/21 11:35am Sinus tachycardia rate 105.  No Acute ST/T changes and no prior for comparison.    Preethi Gomez MD  [facility] Medicine Service  [phone]

## 2021-10-23 LAB
ANION GAP SERPL CALCULATED.3IONS-SCNC: 11 MMOL/L (ref 5–18)
ANION GAP SERPL CALCULATED.3IONS-SCNC: 12 MMOL/L (ref 5–18)
ANION GAP SERPL CALCULATED.3IONS-SCNC: 13 MMOL/L (ref 5–18)
ANION GAP SERPL CALCULATED.3IONS-SCNC: 16 MMOL/L (ref 5–18)
ANION GAP SERPL CALCULATED.3IONS-SCNC: 17 MMOL/L (ref 5–18)
ATRIAL RATE - MUSE: 105 BPM
BASE EXCESS BLDV CALC-SCNC: -6.7 MMOL/L
BUN SERPL-MCNC: 5 MG/DL (ref 8–22)
BUN SERPL-MCNC: 5 MG/DL (ref 8–22)
BUN SERPL-MCNC: 6 MG/DL (ref 8–22)
BUN SERPL-MCNC: 6 MG/DL (ref 8–22)
BUN SERPL-MCNC: 7 MG/DL (ref 8–22)
BUN SERPL-MCNC: 7 MG/DL (ref 8–22)
BUN SERPL-MCNC: 8 MG/DL (ref 8–22)
CALCIUM SERPL-MCNC: 8 MG/DL (ref 8.5–10.5)
CALCIUM SERPL-MCNC: 8.1 MG/DL (ref 8.5–10.5)
CALCIUM SERPL-MCNC: 8.2 MG/DL (ref 8.5–10.5)
CALCIUM SERPL-MCNC: 8.2 MG/DL (ref 8.5–10.5)
CALCIUM SERPL-MCNC: 8.3 MG/DL (ref 8.5–10.5)
CALCIUM SERPL-MCNC: 8.5 MG/DL (ref 8.5–10.5)
CALCIUM SERPL-MCNC: 9.2 MG/DL (ref 8.5–10.5)
CHLORIDE BLD-SCNC: 101 MMOL/L (ref 98–107)
CHLORIDE BLD-SCNC: 102 MMOL/L (ref 98–107)
CHLORIDE BLD-SCNC: 103 MMOL/L (ref 98–107)
CHLORIDE BLD-SCNC: 104 MMOL/L (ref 98–107)
CHLORIDE BLD-SCNC: 106 MMOL/L (ref 98–107)
CO2 SERPL-SCNC: 15 MMOL/L (ref 22–31)
CO2 SERPL-SCNC: 17 MMOL/L (ref 22–31)
CO2 SERPL-SCNC: 18 MMOL/L (ref 22–31)
CO2 SERPL-SCNC: 18 MMOL/L (ref 22–31)
CO2 SERPL-SCNC: 20 MMOL/L (ref 22–31)
CO2 SERPL-SCNC: 21 MMOL/L (ref 22–31)
CO2 SERPL-SCNC: 21 MMOL/L (ref 22–31)
CREAT SERPL-MCNC: 1.42 MG/DL (ref 0.7–1.3)
CREAT SERPL-MCNC: 1.53 MG/DL (ref 0.7–1.3)
CREAT SERPL-MCNC: 1.56 MG/DL (ref 0.7–1.3)
CREAT SERPL-MCNC: 1.56 MG/DL (ref 0.7–1.3)
CREAT SERPL-MCNC: 1.59 MG/DL (ref 0.7–1.3)
CREAT SERPL-MCNC: 1.66 MG/DL (ref 0.7–1.3)
CREAT SERPL-MCNC: 1.76 MG/DL (ref 0.7–1.3)
DIASTOLIC BLOOD PRESSURE - MUSE: NORMAL MMHG
GFR SERPL CREATININE-BSD FRML MDRD: 50 ML/MIN/1.73M2
GFR SERPL CREATININE-BSD FRML MDRD: 54 ML/MIN/1.73M2
GFR SERPL CREATININE-BSD FRML MDRD: 57 ML/MIN/1.73M2
GFR SERPL CREATININE-BSD FRML MDRD: 58 ML/MIN/1.73M2
GFR SERPL CREATININE-BSD FRML MDRD: 58 ML/MIN/1.73M2
GFR SERPL CREATININE-BSD FRML MDRD: 59 ML/MIN/1.73M2
GFR SERPL CREATININE-BSD FRML MDRD: 65 ML/MIN/1.73M2
GLUCOSE BLD-MCNC: 151 MG/DL (ref 70–125)
GLUCOSE BLD-MCNC: 154 MG/DL (ref 70–125)
GLUCOSE BLD-MCNC: 167 MG/DL (ref 70–125)
GLUCOSE BLD-MCNC: 176 MG/DL (ref 70–125)
GLUCOSE BLD-MCNC: 186 MG/DL (ref 70–125)
GLUCOSE BLD-MCNC: 206 MG/DL (ref 70–125)
GLUCOSE BLD-MCNC: 261 MG/DL (ref 70–125)
GLUCOSE BLDC GLUCOMTR-MCNC: 101 MG/DL (ref 70–99)
GLUCOSE BLDC GLUCOMTR-MCNC: 122 MG/DL (ref 70–99)
GLUCOSE BLDC GLUCOMTR-MCNC: 136 MG/DL (ref 70–99)
GLUCOSE BLDC GLUCOMTR-MCNC: 150 MG/DL (ref 70–99)
GLUCOSE BLDC GLUCOMTR-MCNC: 157 MG/DL (ref 70–99)
GLUCOSE BLDC GLUCOMTR-MCNC: 161 MG/DL (ref 70–99)
GLUCOSE BLDC GLUCOMTR-MCNC: 169 MG/DL (ref 70–99)
GLUCOSE BLDC GLUCOMTR-MCNC: 177 MG/DL (ref 70–99)
GLUCOSE BLDC GLUCOMTR-MCNC: 183 MG/DL (ref 70–99)
GLUCOSE BLDC GLUCOMTR-MCNC: 187 MG/DL (ref 70–99)
GLUCOSE BLDC GLUCOMTR-MCNC: 190 MG/DL (ref 70–99)
GLUCOSE BLDC GLUCOMTR-MCNC: 214 MG/DL (ref 70–99)
GLUCOSE BLDC GLUCOMTR-MCNC: 216 MG/DL (ref 70–99)
GLUCOSE BLDC GLUCOMTR-MCNC: 241 MG/DL (ref 70–99)
GLUCOSE BLDC GLUCOMTR-MCNC: 268 MG/DL (ref 70–99)
GLUCOSE BLDC GLUCOMTR-MCNC: 280 MG/DL (ref 70–99)
HCO3 BLDV-SCNC: 19 MMOL/L (ref 24–30)
HOLD SPECIMEN: NORMAL
INTERPRETATION ECG - MUSE: NORMAL
KETONES BLD-SCNC: 3.15 MMOL/L
KETONES BLD-SCNC: 3.23 MMOL/L
KETONES BLD-SCNC: 3.46 MMOL/L
KETONES BLD-SCNC: 3.84 MMOL/L
KETONES BLD-SCNC: 4.95 MMOL/L
KETONES BLD-SCNC: 5.08 MMOL/L
KETONES BLD-SCNC: 6.83 MMOL/L
OXYHGB MFR BLDV: 78.4 % (ref 70–75)
P AXIS - MUSE: 19 DEGREES
PCO2 BLDV: 33 MM HG (ref 35–50)
PH BLDV: 7.36 [PH] (ref 7.35–7.45)
PO2 BLDV: 41 MM HG (ref 25–47)
POTASSIUM BLD-SCNC: 2.8 MMOL/L (ref 3.5–5)
POTASSIUM BLD-SCNC: 2.9 MMOL/L (ref 3.5–5)
POTASSIUM BLD-SCNC: 3.1 MMOL/L (ref 3.5–5)
POTASSIUM BLD-SCNC: 3.2 MMOL/L (ref 3.5–5)
POTASSIUM BLD-SCNC: 3.4 MMOL/L (ref 3.5–5)
PR INTERVAL - MUSE: 140 MS
QRS DURATION - MUSE: 102 MS
QT - MUSE: 370 MS
QTC - MUSE: 489 MS
R AXIS - MUSE: -36 DEGREES
SAO2 % BLDV: 79.9 % (ref 70–75)
SODIUM SERPL-SCNC: 132 MMOL/L (ref 136–145)
SODIUM SERPL-SCNC: 133 MMOL/L (ref 136–145)
SODIUM SERPL-SCNC: 135 MMOL/L (ref 136–145)
SODIUM SERPL-SCNC: 135 MMOL/L (ref 136–145)
SODIUM SERPL-SCNC: 136 MMOL/L (ref 136–145)
SYSTOLIC BLOOD PRESSURE - MUSE: NORMAL MMHG
T AXIS - MUSE: 57 DEGREES
VENTRICULAR RATE- MUSE: 105 BPM

## 2021-10-23 PROCEDURE — 250N000013 HC RX MED GY IP 250 OP 250 PS 637: Performed by: FAMILY MEDICINE

## 2021-10-23 PROCEDURE — 250N000012 HC RX MED GY IP 250 OP 636 PS 637: Performed by: FAMILY MEDICINE

## 2021-10-23 PROCEDURE — 82805 BLOOD GASES W/O2 SATURATION: CPT | Performed by: FAMILY MEDICINE

## 2021-10-23 PROCEDURE — 80048 BASIC METABOLIC PNL TOTAL CA: CPT | Performed by: FAMILY MEDICINE

## 2021-10-23 PROCEDURE — 82010 KETONE BODYS QUAN: CPT | Performed by: FAMILY MEDICINE

## 2021-10-23 PROCEDURE — 120N000001 HC R&B MED SURG/OB

## 2021-10-23 PROCEDURE — 99233 SBSQ HOSP IP/OBS HIGH 50: CPT | Performed by: FAMILY MEDICINE

## 2021-10-23 PROCEDURE — 82374 ASSAY BLOOD CARBON DIOXIDE: CPT | Performed by: FAMILY MEDICINE

## 2021-10-23 PROCEDURE — 250N000011 HC RX IP 250 OP 636: Performed by: FAMILY MEDICINE

## 2021-10-23 PROCEDURE — 258N000001 HC RX 258: Performed by: INTERNAL MEDICINE

## 2021-10-23 RX ORDER — NICOTINE POLACRILEX 4 MG
15-30 LOZENGE BUCCAL
Status: DISCONTINUED | OUTPATIENT
Start: 2021-10-23 | End: 2021-10-24 | Stop reason: HOSPADM

## 2021-10-23 RX ORDER — DEXTROSE MONOHYDRATE 25 G/50ML
25-50 INJECTION, SOLUTION INTRAVENOUS
Status: DISCONTINUED | OUTPATIENT
Start: 2021-10-23 | End: 2021-10-24 | Stop reason: HOSPADM

## 2021-10-23 RX ORDER — POTASSIUM CHLORIDE 29.8 MG/ML
20 INJECTION INTRAVENOUS
Status: COMPLETED | OUTPATIENT
Start: 2021-10-23 | End: 2021-10-23

## 2021-10-23 RX ORDER — POTASSIUM CHLORIDE 1500 MG/1
40 TABLET, EXTENDED RELEASE ORAL ONCE
Status: COMPLETED | OUTPATIENT
Start: 2021-10-23 | End: 2021-10-23

## 2021-10-23 RX ADMIN — INSULIN GLARGINE 40 UNITS: 100 INJECTION, SOLUTION SUBCUTANEOUS at 09:30

## 2021-10-23 RX ADMIN — DEXTROSE AND SODIUM CHLORIDE 1000 ML: 5; 450 INJECTION, SOLUTION INTRAVENOUS at 02:45

## 2021-10-23 RX ADMIN — ZOLPIDEM TARTRATE 5 MG: 5 TABLET ORAL at 00:04

## 2021-10-23 RX ADMIN — POTASSIUM CHLORIDE 20 MEQ: 29.8 INJECTION, SOLUTION INTRAVENOUS at 07:37

## 2021-10-23 RX ADMIN — POTASSIUM CHLORIDE 20 MEQ: 29.8 INJECTION, SOLUTION INTRAVENOUS at 05:13

## 2021-10-23 RX ADMIN — INSULIN ASPART 1 UNITS: 100 INJECTION, SOLUTION INTRAVENOUS; SUBCUTANEOUS at 12:41

## 2021-10-23 RX ADMIN — INSULIN ASPART 10 UNITS: 100 INJECTION, SOLUTION INTRAVENOUS; SUBCUTANEOUS at 16:30

## 2021-10-23 RX ADMIN — POTASSIUM CHLORIDE 20 MEQ: 29.8 INJECTION, SOLUTION INTRAVENOUS at 03:10

## 2021-10-23 RX ADMIN — POTASSIUM CHLORIDE 20 MEQ: 29.8 INJECTION, SOLUTION INTRAVENOUS at 09:25

## 2021-10-23 RX ADMIN — ACETAMINOPHEN 975 MG: 325 TABLET ORAL at 22:05

## 2021-10-23 RX ADMIN — POTASSIUM CHLORIDE 20 MEQ: 29.8 INJECTION, SOLUTION INTRAVENOUS at 04:15

## 2021-10-23 RX ADMIN — INSULIN ASPART 15 UNITS: 100 INJECTION, SOLUTION INTRAVENOUS; SUBCUTANEOUS at 10:34

## 2021-10-23 RX ADMIN — POTASSIUM CHLORIDE 20 MEQ: 29.8 INJECTION, SOLUTION INTRAVENOUS at 00:54

## 2021-10-23 RX ADMIN — HEPARIN SODIUM 5000 UNITS: 10000 INJECTION, SOLUTION INTRAVENOUS; SUBCUTANEOUS at 07:39

## 2021-10-23 RX ADMIN — INSULIN ASPART 15 UNITS: 100 INJECTION, SOLUTION INTRAVENOUS; SUBCUTANEOUS at 12:41

## 2021-10-23 RX ADMIN — INSULIN ASPART 1 UNITS: 100 INJECTION, SOLUTION INTRAVENOUS; SUBCUTANEOUS at 21:20

## 2021-10-23 RX ADMIN — ACETAMINOPHEN 975 MG: 325 TABLET ORAL at 06:31

## 2021-10-23 RX ADMIN — ACETAMINOPHEN 975 MG: 325 TABLET ORAL at 14:06

## 2021-10-23 RX ADMIN — PANTOPRAZOLE SODIUM 40 MG: 20 TABLET, DELAYED RELEASE ORAL at 16:25

## 2021-10-23 RX ADMIN — POTASSIUM CHLORIDE 40 MEQ: 20 TABLET, EXTENDED RELEASE ORAL at 16:26

## 2021-10-23 RX ADMIN — HEPARIN SODIUM 5000 UNITS: 10000 INJECTION, SOLUTION INTRAVENOUS; SUBCUTANEOUS at 19:53

## 2021-10-23 RX ADMIN — PANTOPRAZOLE SODIUM 40 MG: 20 TABLET, DELAYED RELEASE ORAL at 07:39

## 2021-10-23 ASSESSMENT — ACTIVITIES OF DAILY LIVING (ADL)
ADLS_ACUITY_SCORE: 8

## 2021-10-23 NOTE — PLAN OF CARE
Problem: Hyperglycemia  Goal: Blood Glucose Level Within Targeted Range  Outcome: Improving   On insulin drip per DKA protocol. Blood sugar has been improving.  On D5 in 1/2 saline running at 125 ml/hr. Follow K protocol. Anion gap has been closed. Ketones is improving.    Problem: Risk for Delirium  Goal: Improved Sleep  Outcome: Improving  Intervention: Promote Sleep  Slept more than 4 hours.    Patient used his Bipap from home. Vital signs has been stable.

## 2021-10-23 NOTE — DISCHARGE INSTRUCTIONS
DIABETES REMINDERS:  1) Check your blood sugar in the morning, before meals and bedtime. You can also check sometimes 2 hours after a meal  Always bring your blood sugar log and meter to your diabetes-related appointments.  2) Your blood sugar goals:  80-130mg/dL before eating  and  mg/dL 2 hours after eating (or per your doctor).  3) Always be prepared to treat a low blood sugar should it happen. Keep a sugar-containing beverage or food nearby.  4) When to call your clinic:     Blood sugar over 400 mg/dL.     If you have 2 to 3 low blood sugars (under 70mg/dL) in a row,     Low reading the same time of day several days in a row,    Blood sugars elevated and you can not get them down with your usual diabetes regimen,    You are ill and can't keep blood sugars controlled.   5) When to call 911:  If your blood glucose does not get better with treatment, or if you/someone else is unable to give you treatment.  6) Talk to your primary care doctor about an appointment with a Certified Diabetes Educator to assist you with your BG management. You can also request a referral for an endocrinologist, this is a diabetes doctor. You will want to get antibody tested to determine if you are type 1 vs. Type 2.

## 2021-10-23 NOTE — PROGRESS NOTES
[facility]    Medicine Progress Note - Hospitalist Service       Date of Admission:  10/22/2021    Assessment & Plan           Patient is a 30-year-old male with hypertension, T2DM, hyperlipidemia, PARKER on CPAP, morbid obesity that presented with abnormal findings at PCP with glucose greater than 300, serum creatinine 2.27 with acidosis pH 7.27 and bicarb 16 with anion gap of 24.    T2DM in mild DKA  -Previously just prediabetic on Metformin now with A1c greater than 13  -10/23: Bicarb normalizing at 21, anion gap is closed.    -Started on DKA protocol  -RD and diabetic RN for education  -Will be discharged with short and long-acting insulin  -Will transition for IV to subc today.  Required 125 units on drip.  Will start weight-based insulin.  Will likely need higher doses above his weight-based due to insulin resistance and body habitus.  -Start Lantus 40 units  -Start 15 units aspart with meals  -Medium sliding scale insulin  -Check beta hydroxy, VBG for pH    CHET  -Secondary to DKA and diabetes, serum creatinine 2.23 on admission improving to 1.56>1.42 today with IVF.  -Hold Metformin  -Trend BMP    Hypertension  -Normotensive, at home on ACE/ARB and diuretic  -As needed hydralazine    Nausea-symptoms of resolved    PARKER- continue home CPAP    Hypokalemia-potassium 3.3 on replacement protocol       Diet: Clear Liquid Diet    DVT Prophylaxis: Ambulate every shift  Leal Catheter: Not present  Central Lines: None  Code Status: Full Code      Disposition Plan   Expected discharge: 10/24/2021   recommended to prior living arrangement      The patient's care was discussed with the Patient.    Grzegorz Pendleton MD  Hospitalist Service  [facility]  Securely message with the Vocera Web Console (learn more here)  Text page via [employer] Paging/Directory    This note was written with the Dragon audio recording device, any spelling or grammatical errors are  unintentional.    ______________________________________________________________________    Interval History     Patient says he was on Metformin in the past however it upset his stomach so he stopped taking it.  Will discuss with him that he will be leaving here on insulin.  Discussed DKA and transition from IV to subcutaneous insulin.  Discussed his kidney function improvements made with IV fluid and controlling blood sugar.  Patient says he was given high sugary drinks this morning due to clear liquid diet.  Discussed that was likely what caused a bump in his blood sugar today.  Discussed anticipate discharge tomorrow.    The 10 point Review of Systems is negative other than noted in the HPI or here.     Data reviewed today: I reviewed all medications, new labs and imaging results over the last 24 hours.     Physical Exam   Vital Signs: Temp: 98.3  F (36.8  C) Temp src: Oral BP: 124/70 Pulse: 94   Resp: 18 SpO2: 96 % O2 Device: BiPAP/CPAP (from home)    Weight: 360 lbs 0 oz    Physical Examination:   General appearance - alert, well appearing, and in no distress.  Obese  Mental status - alert, oriented to person, place, and time, normal mood, behavior, speech, dress, motor activity, and thought processes  HEENT - sclera anicteric, left eye normal, right eye normal, nares normal and patent, no erythema, discharge or polyps and sinuses normal and nontender, mucous membranes moist, pharynx normal without lesions, dental hygiene good and tongue normal  Respiratory - clear to auscultation, no wheezes, rales or rhonchi  Cardiac - normal rate, regular rhythm, normal S1, S2, no murmurs  Abdomen - soft, nontender, nondistended, no masses or organomegaly no rebound tenderness noted bowel sounds normal, no bladder distension noted  Neurological - alert, oriented, normal speech, no focal findings or movement disorder noted  Musculoskeletal - no joint tenderness, deformity or swelling,   Extremities - peripheral pulses  normal, no pedal edema,  Skin - normal coloration and turgor, no rashes, no suspicious skin lesions noted    Data   Recent Labs   Lab 10/23/21  0823 10/23/21  0713 10/23/21  0619 10/23/21  0414 10/23/21  0411 10/23/21  0218 10/23/21  0215 10/22/21  1337 10/22/21  1152   WBC  --   --   --   --   --   --   --   --  7.1   HGB  --   --   --   --   --   --   --   --  16.0   MCV  --   --   --   --   --   --   --   --  81   PLT  --   --   --   --   --   --   --   --  297   NA  --   --  136  --  135*  --  136   < > 134*   POTASSIUM  --   --  3.2*  --  2.9*  --  2.8*   < > 3.3*   CHLORIDE  --   --  104  --  103  --  103   < > 94*   CO2  --   --  21*  --  21*  --  17*   < > 16*   BUN  --   --  6*  --  7*  --  7*   < > 12   CR  --   --  1.56*  --  1.56*  --  1.66*   < > 2.23*   ANIONGAP  --   --  11  --  11  --  16   < > 24*   SARAH  --   --  8.2*  --  8.1*  --  8.3*   < > 9.7   * 169* 154*   < > 151*   < > 186*   < > 335*   ALBUMIN  --   --   --   --   --   --   --   --  5.1*  5.0   PROTTOTAL  --   --   --   --   --   --   --   --  9.5*  9.4*   BILITOTAL  --   --   --   --   --   --   --   --  0.7  0.7   ALKPHOS  --   --   --   --   --   --   --   --  83  79   ALT  --   --   --   --   --   --   --   --  27  26   AST  --   --   --   --   --   --   --   --  18  17    < > = values in this interval not displayed.         Recent Results (from the past 24 hour(s))   XR Chest Port 1 View    Narrative    EXAM: XR CHEST PORT 1 VIEW  LOCATION: [facility]  DATE/TIME: 10/22/2021 2:14 PM    INDICATION: DKA, nausea.  COMPARISON: 12/19/2019      Impression    IMPRESSION: Large body habitus. Lungs are clear. Heart and pulmonary vascularity are normal. No signs of acute disease.       Medications     NaCl Stopped (10/22/21 1900)     dextrose 5% and 0.45% NaCl 125 mL/hr at 10/23/21 0400     insulin (regular) 3 Units/hr (10/23/21 0715)     - MEDICATION INSTRUCTIONS -       sodium chloride          acetaminophen  975 mg Oral Q8H     heparin ANTICOAGULANT  5,000 Units Subcutaneous Q12H     pantoprazole  40 mg Oral BID AC     potassium chloride  20 mEq Intravenous Q1H     sodium chloride (PF)  3 mL Intracatheter Q8H

## 2021-10-23 NOTE — PLAN OF CARE
Problem: Adult Inpatient Plan of Care  Goal: Optimal Comfort and Wellbeing  Outcome: Improving   Pt into room 431 from ICU. Oriented to unit & call light. Sitting in bed eating dinner.

## 2021-10-24 VITALS
BODY MASS INDEX: 39.17 KG/M2 | DIASTOLIC BLOOD PRESSURE: 80 MMHG | HEIGHT: 75 IN | SYSTOLIC BLOOD PRESSURE: 130 MMHG | TEMPERATURE: 98.2 F | HEART RATE: 90 BPM | OXYGEN SATURATION: 98 % | RESPIRATION RATE: 16 BRPM | WEIGHT: 315 LBS

## 2021-10-24 LAB
ANION GAP SERPL CALCULATED.3IONS-SCNC: 13 MMOL/L (ref 5–18)
BUN SERPL-MCNC: 6 MG/DL (ref 8–22)
CALCIUM SERPL-MCNC: 8.9 MG/DL (ref 8.5–10.5)
CHLORIDE BLD-SCNC: 102 MMOL/L (ref 98–107)
CO2 SERPL-SCNC: 22 MMOL/L (ref 22–31)
CREAT SERPL-MCNC: 1.37 MG/DL (ref 0.7–1.3)
GFR SERPL CREATININE-BSD FRML MDRD: 68 ML/MIN/1.73M2
GLUCOSE BLD-MCNC: 210 MG/DL (ref 70–125)
GLUCOSE BLDC GLUCOMTR-MCNC: 179 MG/DL (ref 70–99)
GLUCOSE BLDC GLUCOMTR-MCNC: 203 MG/DL (ref 70–99)
POTASSIUM BLD-SCNC: 3.1 MMOL/L (ref 3.5–5)
SODIUM SERPL-SCNC: 137 MMOL/L (ref 136–145)

## 2021-10-24 PROCEDURE — 250N000012 HC RX MED GY IP 250 OP 636 PS 637: Performed by: FAMILY MEDICINE

## 2021-10-24 PROCEDURE — 250N000013 HC RX MED GY IP 250 OP 250 PS 637: Performed by: EMERGENCY MEDICINE

## 2021-10-24 PROCEDURE — 80048 BASIC METABOLIC PNL TOTAL CA: CPT | Performed by: FAMILY MEDICINE

## 2021-10-24 PROCEDURE — 250N000013 HC RX MED GY IP 250 OP 250 PS 637: Performed by: FAMILY MEDICINE

## 2021-10-24 PROCEDURE — 99239 HOSP IP/OBS DSCHRG MGMT >30: CPT | Performed by: FAMILY MEDICINE

## 2021-10-24 RX ORDER — GLUCOSAMINE HCL/CHONDROITIN SU 500-400 MG
CAPSULE ORAL
Qty: 100 EACH | Refills: 3 | Status: SHIPPED | OUTPATIENT
Start: 2021-10-24

## 2021-10-24 RX ORDER — PEN NEEDLE, DIABETIC 32GX 5/32"
NEEDLE, DISPOSABLE MISCELLANEOUS
Qty: 100 EACH | Refills: 3 | Status: SHIPPED | OUTPATIENT
Start: 2021-10-24

## 2021-10-24 RX ORDER — PEN NEEDLE, DIABETIC 32GX 5/32"
NEEDLE, DISPOSABLE MISCELLANEOUS
Qty: 100 EACH | Refills: 0 | Status: SHIPPED | OUTPATIENT
Start: 2021-10-24

## 2021-10-24 RX ORDER — POTASSIUM CHLORIDE 1500 MG/1
40 TABLET, EXTENDED RELEASE ORAL ONCE
Status: COMPLETED | OUTPATIENT
Start: 2021-10-24 | End: 2021-10-24

## 2021-10-24 RX ORDER — INSULIN LISPRO 200 [IU]/ML
15 INJECTION, SOLUTION SUBCUTANEOUS
Qty: 30 ML | Refills: 0 | Status: SHIPPED | OUTPATIENT
Start: 2021-10-24

## 2021-10-24 RX ADMIN — INSULIN ASPART 2 UNITS: 100 INJECTION, SOLUTION INTRAVENOUS; SUBCUTANEOUS at 08:46

## 2021-10-24 RX ADMIN — INSULIN ASPART 15 UNITS: 100 INJECTION, SOLUTION INTRAVENOUS; SUBCUTANEOUS at 12:30

## 2021-10-24 RX ADMIN — INSULIN GLARGINE 50 UNITS: 100 INJECTION, SOLUTION SUBCUTANEOUS at 09:48

## 2021-10-24 RX ADMIN — INSULIN ASPART 15 UNITS: 100 INJECTION, SOLUTION INTRAVENOUS; SUBCUTANEOUS at 08:48

## 2021-10-24 RX ADMIN — ZOLPIDEM TARTRATE 5 MG: 5 TABLET ORAL at 00:44

## 2021-10-24 RX ADMIN — POTASSIUM CHLORIDE 40 MEQ: 20 TABLET, EXTENDED RELEASE ORAL at 06:32

## 2021-10-24 RX ADMIN — ACETAMINOPHEN 975 MG: 325 TABLET ORAL at 14:54

## 2021-10-24 RX ADMIN — PANTOPRAZOLE SODIUM 40 MG: 20 TABLET, DELAYED RELEASE ORAL at 06:32

## 2021-10-24 RX ADMIN — ACETAMINOPHEN 975 MG: 325 TABLET ORAL at 05:31

## 2021-10-24 RX ADMIN — ZOLPIDEM TARTRATE 5 MG: 5 TABLET ORAL at 00:27

## 2021-10-24 RX ADMIN — INSULIN ASPART 1 UNITS: 100 INJECTION, SOLUTION INTRAVENOUS; SUBCUTANEOUS at 12:29

## 2021-10-24 ASSESSMENT — ACTIVITIES OF DAILY LIVING (ADL)
ADLS_ACUITY_SCORE: 8

## 2021-10-24 NOTE — PLAN OF CARE
Problem: Adult Inpatient Plan of Care  Goal: Optimal Comfort and Wellbeing  Outcome: Improving     Problem: Hyperglycemia  Goal: Blood Glucose Level Within Targeted Range  Outcome: Improving     Problem: Adult Inpatient Plan of Care  Goal: Absence of Hospital-Acquired Illness or Injury  Intervention: Identify and Manage Fall Risk  Recent Flowsheet Documentation  Taken 10/23/2021 1800 by Kate Diane, RN  Safety Promotion/Fall Prevention:   lighting adjusted   patient and family education  Intervention: Prevent Skin Injury  Recent Flowsheet Documentation  Taken 10/23/2021 1800 by Kate Diane RN  Body Position: position changed independently   Bedtime Xf=075, given 1 unit of novolog pen insulin. Eating bedtime snack. No complaints of pain.

## 2021-10-24 NOTE — PLAN OF CARE
Problem: Hyperglycemia  Goal: Blood Glucose Level Within Targeted Range  Outcome: Adequate for Discharge   Blood sugar 203 before breakfast and 179 before lunch.  Patient self administered insulin using insulin pens.  Denies pain.  Independent in room.  Discharge education and instructions reviewed.  Questions answered.  Belongings sent with patient.  Discharging to home, transported by father.

## 2021-10-24 NOTE — PLAN OF CARE
Problem: Adult Inpatient Plan of Care  Goal: Plan of Care Review  Outcome: Improving  Goal: Patient-Specific Goal (Individualized)  Outcome: Improving  Goal: Absence of Hospital-Acquired Illness or Injury  Outcome: Improving  Goal: Optimal Comfort and Wellbeing  Outcome: Improving  Goal: Readiness for Transition of Care  Outcome: Improving     Problem: Risk for Delirium  Goal: Optimal Coping  Outcome: Improving  Goal: Improved Behavioral Control  Outcome: Improving  Goal: Improved Attention and Thought Clarity  Outcome: Improving  Goal: Improved Sleep  Outcome: Improving     Problem: Hyperglycemia  Goal: Blood Glucose Level Within Targeted Range  Outcome: Improving

## 2021-10-24 NOTE — DISCHARGE SUMMARY
[facility]  Hospitalist Discharge Summary      Date of Admission:  10/22/2021  Date of Discharge:  10/24/2021  Discharging Provider: Grzegorz Pendleton MD      Discharge Diagnoses   Active Problems:    Hypertension    Type 2 diabetes mellitus with hyperglycemia, without long-term current use of insulin (H)    Acute kidney injury (H)    DKA (diabetic ketoacidosis) (H)    Follow-ups Needed After Discharge   Follow-up Appointments     Follow-up and recommended labs and tests       Follow up with primary care provider, Lili Mix, on wednesday   to evaluate medication change, to evaluate treatment change, for hospital   follow- up, and regarding new diagnosis.  The following labs/tests are   recommended: BMP.         Need to check ARIANA 65 to determin type 1 or 2. Seems like type 2.     Unresulted Labs Ordered in the Past 30 Days of this Admission     Date and Time Order Name Status Description    10/22/2021  2:10 PM Blood Culture Peripheral Blood Preliminary       [place] [facility] [place]position   Discharged to home  Condition at discharge: Good    Hospital Course   30-year-old male with hypertension, T2DM, hyperlipidemia, PARKER on CPAP, morbid obesity that presented with abnormal findings at PCP with glucose greater than 300, serum creatinine 2.27 with acidosis pH 7.27 and bicarb 16 with anion gap of 24.  Effectively transitioned from IV insulin to subcutaneous insulin with blood sugars under 200 oncurrent regimen.  CHET is improving and will continue to improve with p.o. fluids at home.      T2DM   Mild DKA-resolved  -Previously just prediabetic on Metformin now with A1c greater than 13  -10/23: Bicarb normalized and gap closed, transitioned from IV to subcutaneous.  -RD and diabetic RN for education  -Continue Lantus 50 units  -Continue 15 units aspart with meals  -Instructed to keep blood sugar log     CHET  -Secondary to DKA and diabetes, serum  creatinine 2.23 on admission improving to 1.56>1.42>1.37 today with IVF.  Encourage p.o. fluid.  -Hold Metformin.  Resume if CHET has resolved.  He has been GI sensitive to Metformin in the past.  -Repeat BMP on Wednesday      Hypertension  -Normotensive  -held home chlorthalidone  -recommend low dose ace inhibitor after serum creatinine normalizes     Nausea-symptoms of resolved     PARKER- continue home CPAP     Hypokalemia-potassium 3.3>3.1 on replacement protocol    Consultations This Hospital Stay   DIABETES EDUCATION IP CONSULT  NUTRITION SERVICES ADULT IP CONSULT  NUTRITION SERVICES ADULT IP CONSULT  VASCULAR ACCESS ADULT IP CONSULT    Code Status   Full Code    Time Spent on this Encounter   I, Grzegorz Pendleton MD, personally saw the patient today and spent greater than 30 minutes discharging this patient.       Grzegorz Pendleton MD  [address] [address]  Phone: [phone]  Fax: [phone]  ______________________________________________________________________    Physical Exam   Vital Signs: Temp: 98.2  F (36.8  C) Temp src: Oral BP: 130/80 Pulse: 90   Resp: 16 SpO2: 98 % O2 Device: None (Room air)    Weight: 360 lbs 0 oz  General appearance - alert, well appearing, and in no distress.  Obese  Mental status - alert, oriented to person, place, and time, normal mood, behavior, speech, dress, motor activity, and thought processes  HEENT - sclera anicteric, normocephalic atraumatic  Respiratory - clear to auscultation, no wheezes, rales or rhonchi  Cardiac - normal rate, regular rhythm, normal S1, S2, no murmurs  Abdomen -soft nontender nondistended  Neurological - alert, oriented, normal speech, no focal findings or movement disorder noted  Musculoskeletal - no joint tenderness, deformity or swelling,   Extremities - peripheral pulses normal, no pedal edema,  Skin - normal coloration and turgor, no rashes, no suspicious skin lesions noted        Primary Care Physician   Lili Mix    Discharge Orders      Reason for your hospital stay    DKA     Follow-up and recommended labs and tests     Follow up with primary care provider, Lili Mix, on wednesday to evaluate medication change, to evaluate treatment change, for hospital follow- up, and regarding new diagnosis.  The following labs/tests are recommended: BMP.     Activity    Your activity upon discharge: activity as tolerated     Diet    Follow this diet upon discharge: Orders Placed This Encounter      Consistent Carbohydrate Diet Consistent Carb 75 grams CHO per Meal Diet       Significant Results and Procedures   Most Recent 3 CBC's:Recent Labs   Lab Test 10/22/21  1152   WBC 7.1   HGB 16.0   MCV 81        Most Recent 3 BMP's:Recent Labs   Lab Test 10/24/21  1309 10/24/21  0827 10/24/21  0535 10/23/21  2114 10/23/21  1730 10/23/21  1333 10/23/21  1223   NA  --   --  137  --  136  --  135*   POTASSIUM  --   --  3.1*  --  3.4*  --  3.1*   CHLORIDE  --   --  102  --  103  --  106   CO2  --   --  22  --  20*  --  18*   BUN  --   --  6*  --  5*  --  5*   CR  --   --  1.37*  --  1.59*  --  1.42*   ANIONGAP  --   --  13  --  13  --  11   SARAH  --   --  8.9  --  9.2  --  8.0*   * 203* 210*   < > 167*   < > 176*    < > = values in this interval not displayed.     Most Recent 2 LFT's:Recent Labs   Lab Test 10/22/21  1152   AST 18  17   ALT 27  26   ALKPHOS 83  79   BILITOTAL 0.7  0.7     Most Recent 3 INR's:No lab results found.,   Results for orders placed or performed during the hospital encounter of 10/22/21   XR Chest Port 1 View    Narrative    EXAM: XR CHEST PORT 1 VIEW  LOCATION: [facility]  DATE/TIME: 10/22/2021 2:14 PM    INDICATION: DKA, nausea.  COMPARISON: 12/19/2019      Impression    IMPRESSION: Large body habitus. Lungs are clear. Heart and pulmonary vascularity are normal. No signs of acute disease.       Discharge Medications    Current Discharge Medication List      START taking these medications    Details   !! alcohol swab prep pads Use to swab area of injection/sandi as directed.  Qty: 100 each, Refills: 3    Associated Diagnoses: Diabetic ketoacidosis without coma associated with type 1 diabetes mellitus (H); Type 2 diabetes mellitus with hyperglycemia, without long-term current use of insulin (H)      !! alcohol swab prep pads Use to swab area of injection/sandi as directed.  Qty: 100 each, Refills: 3    Associated Diagnoses: Diabetic ketoacidosis without coma associated with type 1 diabetes mellitus (H); Type 2 diabetes mellitus with hyperglycemia, without long-term current use of insulin (H)      !! blood glucose (NO BRAND SPECIFIED) test strip Use to test blood sugar 4  times daily or as directed.  Qty: 100 strip, Refills: 3    Comments: One touch verio strips  Associated Diagnoses: Diabetic ketoacidosis without coma associated with type 1 diabetes mellitus (H); Type 2 diabetes mellitus with hyperglycemia, without long-term current use of insulin (H)      !! blood glucose (NO BRAND SPECIFIED) test strip Use to test blood sugar 4 times daily or as directed.  Qty: 100 strip, Refills: 3    Comments: delica lancets if covered  Associated Diagnoses: Diabetic ketoacidosis without coma associated with type 1 diabetes mellitus (H); Type 2 diabetes mellitus with hyperglycemia, without long-term current use of insulin (H)      insulin glargine (LANTUS SOLOSTAR) 100 UNIT/ML pen Inject 50 Units Subcutaneous every morning  Qty: 3 mL, Refills: 3    Comments: If Lantus is not covered by insurance, may substitute Basaglar at same dose and frequency.    Associated Diagnoses: Diabetic ketoacidosis without coma associated with type 1 diabetes mellitus (H); Type 2 diabetes mellitus with hyperglycemia, without long-term current use of insulin (H)      Insulin Lispro (HUMALOG KWIKPEN) 200 UNIT/ML soln Inject 15 Units Subcutaneous 3 times daily (before  meals)  Qty: 30 mL, Refills: 0    Associated Diagnoses: Diabetic ketoacidosis without coma associated with type 1 diabetes mellitus (H); Type 2 diabetes mellitus with hyperglycemia, without long-term current use of insulin (H)      !! insulin pen needle (BD JESSICA U/F) 32G X 4 MM miscellaneous Use 3 pen needles daily or as directed.  Qty: 100 each, Refills: 0    Associated Diagnoses: Diabetic ketoacidosis without coma associated with type 1 diabetes mellitus (H); Type 2 diabetes mellitus with hyperglycemia, without long-term current use of insulin (H)      !! insulin pen needle (ULTICARE MICRO) 32G X 4 MM miscellaneous Use 1 pen needles daily or as directed.  Qty: 100 each, Refills: 3    Associated Diagnoses: Diabetic ketoacidosis without coma associated with type 1 diabetes mellitus (H); Type 2 diabetes mellitus with hyperglycemia, without long-term current use of insulin (H)       !! - Potential duplicate medications found. Please discuss with provider.      CONTINUE these medications which have NOT CHANGED    Details   omeprazole (PRILOSEC) 20 MG DR capsule Take 20 mg by mouth 2 times daily (before meals)      zolpidem ER (AMBIEN CR) 12.5 MG CR tablet Take 12.5 mg by mouth nightly as needed         STOP taking these medications       chlorthalidone (HYGROTON) 50 MG tablet Comments:   Reason for Stopping:         metFORMIN (GLUCOPHAGE) 500 MG tablet Comments:   Reason for Stopping:             Allergies   Allergies   Allergen Reactions     Tramadol Itching     Amlodipine Palpitations

## 2021-10-25 LAB — GLUCOSE BLDC GLUCOMTR-MCNC: 184 MG/DL (ref 70–99)

## 2021-10-28 LAB — BACTERIA BLD CULT: NO GROWTH
